# Patient Record
Sex: FEMALE | Race: WHITE
[De-identification: names, ages, dates, MRNs, and addresses within clinical notes are randomized per-mention and may not be internally consistent; named-entity substitution may affect disease eponyms.]

---

## 2020-07-01 ENCOUNTER — HOSPITAL ENCOUNTER (OUTPATIENT)
Dept: HOSPITAL 53 - M PLALAB | Age: 30
End: 2020-07-01
Attending: ADVANCED PRACTICE MIDWIFE
Payer: COMMERCIAL

## 2020-07-01 DIAGNOSIS — O34.211: Primary | ICD-10-CM

## 2020-07-01 DIAGNOSIS — Z3A.00: ICD-10-CM

## 2020-07-01 LAB
CHLAMYDIA DNA AMPLIFICATION: NEGATIVE
HCT VFR BLD AUTO: 40 % (ref 36–47)
HGB BLD-MCNC: 13.4 G/DL (ref 12–15.5)
MCH RBC QN AUTO: 30.7 PG (ref 27–33)
MCHC RBC AUTO-ENTMCNC: 33.5 G/DL (ref 32–36.5)
MCV RBC AUTO: 91.5 FL (ref 80–96)
N GONORRHOEA RRNA SPEC QL NAA+PROBE: NEGATIVE
PLATELET # BLD AUTO: 227 10^3/UL (ref 150–450)
RBC # BLD AUTO: 4.37 10^6/UL (ref 4–5.4)
WBC # BLD AUTO: 12.2 10^3/UL (ref 4–10)

## 2020-07-02 LAB — HIV 1+2 AB+HIV1 P24 AG SERPL QL IA: NEGATIVE

## 2020-07-03 LAB — HCV AB SER QL: 0.2 INDEX (ref ?–0.8)

## 2020-09-08 ENCOUNTER — HOSPITAL ENCOUNTER (OUTPATIENT)
Dept: HOSPITAL 53 - M WHC | Age: 30
End: 2020-09-08
Attending: ADVANCED PRACTICE MIDWIFE
Payer: COMMERCIAL

## 2020-09-08 DIAGNOSIS — Z34.82: Primary | ICD-10-CM

## 2020-09-29 NOTE — REP
COMPLETE OBSTETRICAL ULTRASOUND: 



CLINICAL: Anatomical assessment. 



TECHNIQUE: Transabdominal obstetrical ultrasound with color Doppler evaluation. 



FINDINGS:

Ultrasound examination demonstrates a single live intrauterine pregnancy in 
variable presentation. The placenta is noted posteriorly, grade 1 and low-lying 
approximately 5 mm from the closed internal os. The cervix measures 3.4 cm in 
length and appears closed. Amniotic fluid volume is normal.  



Fetal heart rate is 150 beats per minute.  



BIOMETRY CHART:                     







BPD 41 mm 18 weeks 3 day 

 

Head circumference 158 mm 18 weeks 5 days

 

Abdominal circumference 143 mm 19 weeks 5 days 

 

Femur length 31 mm 19 weeks 5 days 

 

Humeral length 29 mm 19 weeks 3 days 





Estimated gestational age by current measurements is 19 weeks 1 day with 
estimated date of delivery 2/1/21. Estimated fetal weight is 296 grams (82nd 
percentile). 



Anatomical assessment demonstrates normal cisterna magna, cavum, thalamus, 
spine, stomach, kidneys/bladder, heart/ventricular outflow tracts, three vessel 
cord/cord insertion and extremities.  Limited evaluation of the facial features 
due to positioning. 



IMPRESSION: 

1.   Posterior grade 1 low-lying placenta 5 mm from the closed internal os. 

2.   Single live intrauterine pregnancy in variable presentation demonstrating 
appropriate estimated fetal weight and growth.

3.   Limited evaluation of the facial features may warrant follow up. The 
remainder of the anatomical assessment is complete and normal.  

[facility]

## 2020-10-06 ENCOUNTER — HOSPITAL ENCOUNTER (OUTPATIENT)
Dept: HOSPITAL 53 - M WHC | Age: 30
End: 2020-10-06
Attending: ADVANCED PRACTICE MIDWIFE
Payer: COMMERCIAL

## 2020-10-06 DIAGNOSIS — Z3A.22: ICD-10-CM

## 2020-10-12 NOTE — REP
OB ULTRASOUND 



HISTORY: Follow-up anatomy. 



TECHNIQUE: Real-time sonographic evaluation of the gravid uterus is performed 
utilizing transabdominal and endovaginal technique. 



FINDINGS: 

There is a single living intrauterine gestation with estimated gestational age 
of 22 weeks 6 days. Estimated date of confinement (EDC) 02/03/2021. Todays 
measurements indicate appropriate growth. 



BIOMETRY AND GROWTH:







BPD  53 mm  22 weeks 1 day 29th percentile 

 

HC  203 mm  22 weeks 3 days 37th percentile 

 

AC  178 mm  22 weeks 5 days 45th percentile 

 

Femur length  40 mm  23 weeks 0 days 54th percentile 

 

AC/HC ratio  1.14 Normal  1.04 to 1.23

 

Estimated fetal weight  532 g    36th percentile 





Fetal position is breech. Placenta is posterior grade 1 with no previa or 
abruption. Tip of the placenta is approximately 3 cm from the internal cervical 
os. Umbilical cord inserts on the mid aspect of the placenta. Three-vessel cord 
is noted. Fetal heart rate is 143 beats per minute. Amniotic fluid appears 
within normal limits for gestational age. The visualized fetal anatomy today 
includes facial structures, stomach, kidneys, and bladder. These are all grossly
unremarkable. Cervix is closed and measures 3.5 cm in length.

MTDD

## 2020-10-28 ENCOUNTER — HOSPITAL ENCOUNTER (OUTPATIENT)
Dept: HOSPITAL 53 - M PLALAB | Age: 30
End: 2020-10-28
Attending: ADVANCED PRACTICE MIDWIFE
Payer: COMMERCIAL

## 2020-10-28 DIAGNOSIS — Z34.82: Primary | ICD-10-CM

## 2020-10-28 LAB
HCT VFR BLD AUTO: 30 % (ref 36–47)
HGB BLD-MCNC: 9.8 G/DL (ref 12–15.5)
MCH RBC QN AUTO: 31.3 PG (ref 27–33)
MCHC RBC AUTO-ENTMCNC: 32.7 G/DL (ref 32–36.5)
MCV RBC AUTO: 95.8 FL (ref 80–96)
PLATELET # BLD AUTO: 240 10^3/UL (ref 150–450)
RBC # BLD AUTO: 3.13 10^6/UL (ref 4–5.4)
WBC # BLD AUTO: 11.8 10^3/UL (ref 4–10)

## 2021-01-06 ENCOUNTER — HOSPITAL ENCOUNTER (OUTPATIENT)
Dept: HOSPITAL 53 - M PLALAB | Age: 31
End: 2021-01-06
Attending: OBSTETRICS & GYNECOLOGY
Payer: COMMERCIAL

## 2021-01-06 DIAGNOSIS — Z3A.36: ICD-10-CM

## 2021-01-06 DIAGNOSIS — Z34.83: Primary | ICD-10-CM

## 2021-01-06 LAB
HCT VFR BLD AUTO: 28.5 % (ref 36–47)
HGB BLD-MCNC: 9.1 G/DL (ref 12–15.5)
MCH RBC QN AUTO: 31 PG (ref 27–33)
MCHC RBC AUTO-ENTMCNC: 31.9 G/DL (ref 32–36.5)
MCV RBC AUTO: 96.9 FL (ref 80–96)
PLATELET # BLD AUTO: 211 10^3/UL (ref 150–450)
RBC # BLD AUTO: 2.94 10^6/UL (ref 4–5.4)
WBC # BLD AUTO: 12.4 10^3/UL (ref 4–10)

## 2021-01-22 ENCOUNTER — HOSPITAL ENCOUNTER (OUTPATIENT)
Dept: HOSPITAL 53 - M LABSMTC | Age: 31
End: 2021-01-22
Attending: ANESTHESIOLOGY
Payer: COMMERCIAL

## 2021-01-22 DIAGNOSIS — Z01.812: Primary | ICD-10-CM

## 2021-01-22 DIAGNOSIS — Z20.822: ICD-10-CM

## 2021-01-27 ENCOUNTER — HOSPITAL ENCOUNTER (INPATIENT)
Dept: HOSPITAL 53 - M LDI | Age: 31
LOS: 2 days | Discharge: HOME | DRG: 540 | End: 2021-01-29
Attending: OBSTETRICS & GYNECOLOGY | Admitting: OBSTETRICS & GYNECOLOGY
Payer: COMMERCIAL

## 2021-01-27 VITALS — SYSTOLIC BLOOD PRESSURE: 91 MMHG | DIASTOLIC BLOOD PRESSURE: 50 MMHG

## 2021-01-27 VITALS — DIASTOLIC BLOOD PRESSURE: 50 MMHG | SYSTOLIC BLOOD PRESSURE: 102 MMHG

## 2021-01-27 VITALS — WEIGHT: 143.3 LBS | BODY MASS INDEX: 28.13 KG/M2 | HEIGHT: 60 IN

## 2021-01-27 VITALS — SYSTOLIC BLOOD PRESSURE: 107 MMHG | DIASTOLIC BLOOD PRESSURE: 57 MMHG

## 2021-01-27 VITALS — SYSTOLIC BLOOD PRESSURE: 99 MMHG | DIASTOLIC BLOOD PRESSURE: 53 MMHG

## 2021-01-27 VITALS — DIASTOLIC BLOOD PRESSURE: 52 MMHG | SYSTOLIC BLOOD PRESSURE: 102 MMHG

## 2021-01-27 VITALS — DIASTOLIC BLOOD PRESSURE: 56 MMHG | SYSTOLIC BLOOD PRESSURE: 99 MMHG

## 2021-01-27 VITALS — DIASTOLIC BLOOD PRESSURE: 67 MMHG | SYSTOLIC BLOOD PRESSURE: 107 MMHG

## 2021-01-27 DIAGNOSIS — F17.210: ICD-10-CM

## 2021-01-27 DIAGNOSIS — Z3A.39: ICD-10-CM

## 2021-01-27 DIAGNOSIS — O34.211: Primary | ICD-10-CM

## 2021-01-27 LAB
HCT VFR BLD AUTO: 30.9 % (ref 36–47)
HGB BLD-MCNC: 10.3 G/DL (ref 12–15.5)
MCH RBC QN AUTO: 31 PG (ref 27–33)
MCHC RBC AUTO-ENTMCNC: 33.3 G/DL (ref 32–36.5)
MCV RBC AUTO: 93.1 FL (ref 80–96)
PLATELET # BLD AUTO: 225 10^3/UL (ref 150–450)
RBC # BLD AUTO: 3.32 10^6/UL (ref 4–5.4)
WBC # BLD AUTO: 11.1 10^3/UL (ref 4–10)

## 2021-01-27 RX ADMIN — KETOROLAC TROMETHAMINE SCH MG: 30 INJECTION, SOLUTION INTRAMUSCULAR at 15:42

## 2021-01-27 RX ADMIN — DIPHENHYDRAMINE HYDROCHLORIDE PRN MG: 50 INJECTION, SOLUTION INTRAMUSCULAR; INTRAVENOUS at 15:17

## 2021-01-27 RX ADMIN — DOCUSATE SODIUM PRN MG: 100 CAPSULE, LIQUID FILLED ORAL at 21:55

## 2021-01-27 RX ADMIN — DIPHENHYDRAMINE HYDROCHLORIDE PRN MG: 50 INJECTION, SOLUTION INTRAMUSCULAR; INTRAVENOUS at 19:36

## 2021-01-27 RX ADMIN — KETOROLAC TROMETHAMINE SCH MG: 30 INJECTION, SOLUTION INTRAMUSCULAR at 21:55

## 2021-01-27 NOTE — ROOPDOC
[facility] Report Of Operation


Report of Operation


DATE OF PROCEDURE: 21





Report of operation





Preoperative diagnosis: 39 0/7 weeks, prior 





Postoperative diagnosis: same





Procedure: Repeat low transverse  section.





Surgeon: Shahla Rice M.D.





Asst.: Betty Osullivan CNM





EBL: 500 ml.





Urine output: 100 mL's.





Findings: 6 lbs. 13 oz. male infant, Apgar Score 9 and 9 g, normal uterus, 

fallopian tubes, ovaries.





Operative summary: Patient taken to  the operating room where spinal anesthesia 

was induced. She was prepped draped sterile fashion in the supine position. A 

Justin catheter was placed. A Pfannenstiel skin incision was made with scalpel. 

Fascia was incised and extended bilaterally. The peritoneal cavity was entered. 

A Mobius retractor was placed. A bladder flap was created. A curvilinear 

incision was made in lower uterine segment until Clear fluid was noted. The 

incision was extended manually. The infant was delivered from the vertex 

position without difficulty. Cord was double clamped and cut. The infant was 

handed waiting nurses.  The placenta was expressed. Uterus was closed with O-

Vicryl in a running locked fashion. A second imbricating layer of Vicryl was 

placed.








Peritoneum was closed with 2-0 Vicryl a running fashion. Fascia was closed with 

0 Vicryl in running fashion. Skin was closed 4-0 Monocryl subcuticular sutures. 

Sponge, instrument and needle counts were correct.





Betty Osullivan CNM, assisted with all aspects of the procedure. She helped create 

each layer of the incision and deliver the fetus.











SHAHLA RICE MD             2021 10:41

## 2021-01-27 NOTE — CCD
Summarization of Episode Note

                             Created on: 10/30/2020



MARY LOU BRUMFIELD

External Reference #: [id number]

: 1990

Sex: Female



Demographics





                          Address                   [address]  [address] [place] [address]  [address]

 

                          Home Phone                [phone]

 

                          Preferred Language        Unknown

 

                          Marital Status            Unknown

 

                          Moravian Affiliation     Unknown

 

                          Race                      White

 

                          Ethnic Group              Not  or 





Author





                          Author                    [facility]

ems

 

                          Organization              [facility]

ems

 

                          Address                   Unknown

 

                          Phone                     Unavailable







Support





                Name            Relationship    Address         Phone

 

                    MARY LOU BRUMFIELD     GUAR                [address]

[place]  [address]                    [phone]

 

                    AUDREYSHAMA AGAPITO     ECON                [address]  [address] [place] [address]  [address]                    [phone]







Care Team Providers





                    Care Team Member Name Role                Phone

 

                    AbranBetty        Unavailable         [phone]







PROBLEMS





          Type      Condition ICD9-CM Code LBW99-FO Code Onset Dates Condition S

tatus SNOMED 

Code                                    Notes

 

        Problem Supervision of other normal pregnancy         Z34.80          Ac

tive  [id number]  

 

           Problem    Smoking (tobacco) complicating pregnancy, second trimester

            O99.332               

Active                    [id number]                  







ALLERGIES

No Known Allergies



ENCOUNTERS from 1990 to 2020-10-29





             Encounter    Location     Date         Provider     Diagnosis

 

                          [facility] [address] [place] [address] 

[address]          28 Oct, 2020        Betty Osullivan           







IMMUNIZATIONS

No Information



SOCIAL HISTORY

Tobacco Use:



                    Social History Observation Description         Date

 

                    Details (start date - stop date) Current Smoker       



Sex Assigned At Birth:



                          Social History Observation Description

 

                          Sex Assigned At Birth     Unknown



Domestic Violence:



                    Question            Answer              Notes

 

                    Status:                                 No history of abuse



Sexual Hx:



                    Question            Answer              Notes

 

                    Had sex in the last 12 months (vaginal, oral, or anal)? Yes 

                 

 

                    with                Men only             

 

                    Use protection?     No                   



Tobacco Use:



                    Question            Answer              Notes

 

                    Are you a:          current smoker       

 

                    How many cigarettes a day do you smoke? 5 or less           

 

 

                    Are you interested in quitting? Ready to quit        

 

                    Counseled the patient on tobacco use, cessation provided 2020           







REASON FOR REFERRAL

No Information



VITAL SIGNS

No information



MEDICATIONS





             Medication   SIG (Take, Route, Frequency, Duration) Start Date   En

d Date     Status

 

                    Ferrous Sulfate 325 (65 Fe) MG 1 tablet Orally Once a day fo

r 30 day(s) 28 Oct, 

2020                                                Active

 

             Prenatal Vitamins 28-0.8 MG 1 tablet Orally Once a day             

              Active







PROCEDURES

No Information



RESULTS

No Results



REASON FOR VISIT

No Information



MEDICAL (GENERAL) HISTORY





                    Type                Description         Date

 

                    Surgical History    C section           2015

 

                    Hospitalization History childbirth           







Goals Section

No Information



Health Concerns

No Information



MEDICAL EQUIPMENT

No Information



MENTAL STATUS

No Information



FUNCTIONAL STATUS

No Information



ASSESSMENTS

No Information



PLAN OF TREATMENT

Medication



                Medication Name Sig             Start Date      Stop Date

 

                    Ferrous Sulfate 325 (65 Fe) MG 1 tablet Orally Once a day fo

r 30 day(s) 28 Oct, 

2020                                     



Next Appt



                                        Details

 

                                        Provider Name:Reza Montgomery, 2021

 09:30:00 AM, [address] [place] [address], [address], [phone]

 

                                        Provider Name:Betty Osullivan, 2021 0

9:30:00 AM, [address] [place] [address], [address], [phone]







Insurance Providers





             Payer Name   Payer Address Payer Phone  Insured Name Patient Relati

onship to 

Insured                   Coverage Start Date       Coverage End Date

 

                [facility] [employer] [facility] [address]  [facility] [id number]

[phone]    

MARY LOU BRUMFIELD    self

## 2021-01-27 NOTE — CCD
Summarization of Episode Note

                             Created on: 2020



MARY LOU BRUMFIELD

External Reference #: [id number]

: 1990

Sex: Female



Demographics





                          Address                   [address]

[place]  [address]

 

                          Home Phone                [phone]

 

                          Preferred Language        Unknown

 

                          Marital Status            Unknown

 

                          Yarsanism Affiliation     Unknown

 

                          Race                      White

 

                          Ethnic Group              Not  or 





Author





                          Author                    [facility]

ems

 

                          Organization              [facility]

ems

 

                          Address                   Unknown

 

                          Phone                     Unavailable







Support





                Name            Relationship    Address         Phone

 

                    MARY LOU BRUMFIELD     GUAR                [address]

[place]  [address]                    [phone]

 

                    ANA AGAPITO     ECON                [address]

[place]  [address]                    [phone]







Care Team Providers





                    Care Team Member Name Role                Phone

 

                    DorothymaryTania Unavailable         [phone]







PROBLEMS





          Type      Condition ICD9-CM Code LIH75-VW Code Onset Dates Condition S

tatus SNOMED 

Code                                    Notes

 

           Problem    Smoking (tobacco) complicating pregnancy, second trimester

            O99.332               

Active                    [id number]                  

 

          Problem   Anemia affecting pregnancy in third trimester           O99.

013             Active    

[id number]                                 

 

        Problem Supervision of other normal pregnancy         Z34.80          Ac

tive  [id number]  







ALLERGIES

No Known Allergies



ENCOUNTERS from 1990 to 2020





             Encounter    Location     Date         Provider     Diagnosis

 

                          [facility] [address] [place] 

[address]                  Tania Arguello Maternal care for

 anti-D [Rh] 

antibodies, third trimester, not applicable or unspecified O36.0130 ; Maternal 
care due to low transverse uterine scar from previous  delivery O34.211 
; 29 weeks gestation of pregnancy Z3A.29 and Encounter for immunization Z23







IMMUNIZATIONS





                Vaccine         Route           Administration Date Status

 

                RHo (D) Immune Globulin 300mcg/1.5mL (RhoGAM) IM Intramuscular N

2020    

Administered

 

                TDAP 0.5mL (Boostrix) IM Intramuscular 2020    Administe

red







SOCIAL HISTORY

Tobacco Use:



                    Social History Observation Description         Date

 

                    Details (start date - stop date) Current Smoker       



Sex Assigned At Birth:



                          Social History Observation Description

 

                          Sex Assigned At Birth     Unknown



Domestic Violence:



                    Question            Answer              Notes

 

                    Status:                                 No history of abuse



Sexual Hx:



                    Question            Answer              Notes

 

                    Had sex in the last 12 months (vaginal, oral, or anal)? Yes 

                 

 

                    with                Men only             

 

                    Use protection?     No                   



Tobacco Use:



                    Question            Answer              Notes

 

                    Are you a:          current smoker       

 

                    How many cigarettes a day do you smoke? 5 or less           

 

 

                    Are you interested in quitting? Ready to quit        

 

                    Counseled the patient on tobacco use, cessation provided 2020           







REASON FOR REFERRAL

No Information



VITAL SIGNS





                    Weight              134.6 lbs           

 

                    Height              60 in               

 

                    BMI                 26.287 kg/m2        

 

                    Blood pressure systolic 98 mm Hg            

 

                    Blood pressure diastolic 58 mm Hg            







MEDICATIONS





           Medication SIG (Take, Route, Frequency, Duration) Notes      Start Da

te End Date   

Status

 

           Prenatal Vitamins 28-0.8 MG 1 tablet Orally Once a day               

                   Active

 

                Ferrous Sulfate 325 (65 Fe) MG 1 tablet Orally Once a day for 30

 day(s)                 28 

Oct, 2020                                           Active







PROCEDURES





                Procedure       Date Ordered    Result          Body Site

 

                Immunization: Boostrix 0.5mL IM (TDAP) 2020      N/A      

        

 

                    Injection: RhoGAM 300mcg/1.5mL IM (Rho [D] Immune Globulin H

uman) 2020          N/A

                                         







RESULTS

No Results



REASON FOR VISIT

3-4 WK PN



MEDICAL (GENERAL) HISTORY





                    Type                Description         Date

 

                    Surgical History    C section           2015

 

                    Hospitalization History childbirth           







Goals Section

No Information



Health Concerns

No Information



MEDICAL EQUIPMENT

No Information



MENTAL STATUS

No Information



FUNCTIONAL STATUS

No Information



ASSESSMENTS





             Encounter Date Diagnosis    Assessment Notes Treatment Notes Treatm

ent Clinical 

Notes

 

                                        Maternal care for anti-D [Rh

] antibodies, third trimester, not 

applicable or unspecified (ICD-10 - O36.0130)                           



                                        





 

                                        Maternal care due to low tra

nsverse uterine scar from previous 

 delivery (ICD-10 - O34.211)                           



                                        





 

                    29 weeks gestation of pregnancy (ICD-10 - Z3A.29

)                 



                                        





 

                    Encounter for immunization (ICD-10 - Z23)       

          



                                        











PLAN OF TREATMENT

Next Appt



                                        Details

 

                                        2 Weeks Reason:Prenatal

 

                                        Provider Name:Kami Schumacher, 2020 01:00:00 PM, [address] [place], [address], [phone]

 

                                        Provider Name:Reza Montgomery, 2021

 01:00:00 PM, [address] [place], [address], [phone]

 

                                        Provider Name:Reza Montgomery 2021

 09:30:00 AM, [address] [place] [address], [address], [phone]

 

                                        Provider Name:Betty Osullivan, 2021 0

9:30:00 AM, [address] [place] [address], [address], [phone]

 

                                        Provider Name:Reza Montgomery, 2021-02-10

 09:40:00 AM, [address] [place] [address], [address], [phone]

 

                                        Provider Name:Reza Montgomery, 2021

 11:40:00 AM, [address] [place] [address], [address], [phone]



Follow Up:2 WeeksPrenatal



Insurance Providers





             Payer Name   Payer Address Payer Phone  Insured Name Patient Relati

onship to 

Insured                   Coverage Start Date       Coverage End Date

 

                [facility] hospital COMM[employer] [facility] [address]  [facility] [phone]

[phone]    

MARY LOU BRUMFIELD    self

## 2021-01-27 NOTE — CCD
Summarization of Episode Note

                             Created on: 2021



MARY LOU BRUMFIELD

External Reference #: [id number]

: 1990

Sex: Female



Demographics





                          Address                   [address] [place]  [address]

 

                          Home Phone                [phone]

 

                          Preferred Language        Unknown

 

                          Marital Status            Unknown

 

                          Yazidi Affiliation     Unknown

 

                          Race                      White

 

                          Ethnic Group              Not  or 





Author





                          Author                    [facility]

ems

 

                          Organization              [facility]

ems

 

                          Address                   Unknown

 

                          Phone                     Unavailable







Support





                Name            Relationship    Address         Phone

 

                    LARAMARY LOU SIGALA     GUAR                [address] [place]  [address]                    [phone]

 

                    AGAPITO PEREZ     ECON                [address]  [address]                    [phone]







Care Team Providers





                    Care Team Member Name Role                Phone

 

                    Reza Montgomery      Unavailable         [phone]







PROBLEMS





          Type      Condition ICD9-CM Code NDW93-LL Code Onset Dates Condition S

tatus SNOMED 

Code                                    Notes

 

           Problem    Smoking (tobacco) complicating pregnancy, second trimester

            O99.332               

Active                    [id number]                  

 

          Problem   Anemia affecting pregnancy in third trimester           O99.

013             Active    

13007511                                 

 

        Problem Supervision of other normal pregnancy         Z34.80          Ac

tive  [id number]  







ALLERGIES

No Known Allergies



ENCOUNTERS from 1990 to 2021





             Encounter    Location     Date         Provider     Diagnosis

 

                          [facility] [address] [place] 

[address]                  Reza Montgomery        36 weeks gestation o

f pregnancy Z3A.36 and 

Maternal care due to low transverse uterine scar from previous  delivery
O34.211







IMMUNIZATIONS





                Vaccine         Route           Administration Date Status

 

                RHo (D) Immune Globulin 300mcg/1.5mL (RhoGAM) IM Intramuscular N

2020    

Administered

 

                TDAP 0.5mL (Boostrix) IM Intramuscular 2020    Administe

red







SOCIAL HISTORY

Tobacco Use:



                    Social History Observation Description         Date

 

                    Details (start date - stop date) Current Smoker       



Sex Assigned At Birth:



                          Social History Observation Description

 

                          Sex Assigned At Birth     Unknown



Domestic Violence:



                    Question            Answer              Notes

 

                    Status:                                 No history of abuse



Sexual Hx:



                    Question            Answer              Notes

 

                    Had sex in the last 12 months (vaginal, oral, or anal)? Yes 

                 

 

                    with                Men only             

 

                    Use protection?     No                   



Tobacco Use:



                    Question            Answer              Notes

 

                    Are you a:          current smoker       

 

                    How many cigarettes a day do you smoke? 5 or less           

 

 

                    Are you interested in quitting? Ready to quit        

 

                    Counseled the patient on tobacco use, cessation provided 2020           







REASON FOR REFERRAL

No Information



VITAL SIGNS





                    Weight              141.8 lbs           

 

                    Weight-kg           64.32 kg            

 

                    Height              60 in               

 

                    BMI                 27.693 kg/m2        

 

                    Blood pressure systolic 108 mm Hg           

 

                    Blood pressure diastolic 72 mm Hg            







MEDICATIONS





           Medication SIG (Take, Route, Frequency, Duration) Notes      Start Da

te End Date   

Status

 

           Prenatal Vitamins 28-0.8 MG 1 tablet Orally Once a day               

                   Active

 

                Ferrous Sulfate 325 (65 Fe) MG 1 tablet Orally Once a day for 30

 day(s)                 28 

Oct, 2020                                           Active







PROCEDURES

No Information



RESULTS





                    Component           Value               Reference Range

 

                                        GROUP B STREP CULTURE

Reviewed date:2021 15:25:12

Interpretation:

Performing Lab:[facility], [facility] [address] [facility] [address] [phone], Phone:[phone],[place] [address] 







REASON FOR VISIT

1WK PN & PRE OP C/S 21



MEDICAL (GENERAL) HISTORY





                    Type                Description         Date

 

                    Surgical History    C section           2015

 

                    Hospitalization History childbirth           







Goals Section

No Information



Health Concerns

No Information



MEDICAL EQUIPMENT

No Information



MENTAL STATUS

No Information



FUNCTIONAL STATUS

No Information



ASSESSMENTS





             Encounter Date Diagnosis    Assessment Notes Treatment Notes Treatm

ent Clinical 

Notes

 

                    36 weeks gestation of pregnancy (ICD-10 - Z3A.36

)                 



                                        





 

                                        Maternal care due to low tra

nsverse uterine scar from previous 

 delivery (ICD-10 - O34.211)                           



                                        











PLAN OF TREATMENT

Treatment Notes



                          Test Name                 Order Date

 

                          CBC - Complete Blood Count 2021



Next Appt



                                        Details

 

                                        Provider Name:Anitra Roy, 2021

 11:40:00 AM, [address] [place] [address], [address], [phone]

 

                                        Provider Name:Reza Montgomery, 2021

 09:30:00 AM, [address] [place] [address], [address], [phone]

 

                                        Provider Name:Betty Osullivan, 2021 0

9:30:00 AM, [address] [place] [address], [address], [phone]

 

                                        Provider Name:Reza Montgomery, 2021-02-10

 10:00:00 AM, [address] [place], [address], [phone]

 

                                        Provider Name:Reza Montgomery, 2021

 11:40:00 AM, [address] [place], [address], [phone]







Insurance Providers





             Payer Name   Payer Address Payer Phone  Insured Name Patient Relati

onship to 

Insured                   Coverage Start Date       Coverage End Date

 

                [facility] [employer] [facility] [address]  [facility] [address] 8

-[phone]    

MARY LOU BRUMFIELD

## 2021-01-27 NOTE — CCD
Summarization of Episode Note

                             Created on: 2020



MARY LOU BRUMFIELD

External Reference #: [id number]

: 1990

Sex: Female



Demographics





                          Address                   [address]  [address]

 

                          Home Phone                [phone]

 

                          Preferred Language        Unknown

 

                          Marital Status            Unknown

 

                          Confucianism Affiliation     Unknown

 

                          Race                      White

 

                          Ethnic Group              Not  or 





Author





                          Author                    [facility]

ems

 

                          Organization              [facility]

ems

 

                          Address                   Unknown

 

                          Phone                     Unavailable







Support





                Name            Relationship    Address         Phone

 

                    MARY LOU BRUMFIELD     GUAR                [address]

[place]  [address]                    [phone]

 

                    ANA AGAPITO     ECON                [address] [place] [address]  [address]                    [phone]







Care Team Providers





                    Care Team Member Name Role                Phone

 

                    Reza Montgomery      Unavailable         [phone]







PROBLEMS





          Type      Condition ICD9-CM Code MDA01-LK Code Onset Dates Condition S

tatus SNOMED 

Code                                    Notes

 

        Problem Supervision of other normal pregnancy         Z34.80          Ac

tive  [id number]  

 

           Problem    Smoking (tobacco) complicating pregnancy, second trimester

            O99.332               

Active                    202012164                  







ALLERGIES

No Known Allergies



ENCOUNTERS from 1990 to 2020





             Encounter    Location     Date         Provider     Diagnosis

 

                          [facility] [address] [place] [address] 

[address]                  Reza Montgomery         







IMMUNIZATIONS

No Information



SOCIAL HISTORY

Tobacco Use:



                    Social History Observation Description         Date

 

                    Details (start date - stop date) Current Smoker       



Sex Assigned At Birth:



                          Social History Observation Description

 

                          Sex Assigned At Birth     Unknown



Domestic Violence:



                    Question            Answer              Notes

 

                    Status:                                 No history of abuse



Sexual Hx:



                    Question            Answer              Notes

 

                    Had sex in the last 12 months (vaginal, oral, or anal)? Yes 

                 

 

                    with                Men only             

 

                    Use protection?     No                   



Tobacco Use:



                    Question            Answer              Notes

 

                    Are you a:          current smoker       

 

                    How many cigarettes a day do you smoke? 5 or less           

 

 

                    Are you interested in quitting? Ready to quit        

 

                    Counseled the patient on tobacco use, cessation provided 2020           







REASON FOR REFERRAL

No Information



VITAL SIGNS

No information



MEDICATIONS





           Medication SIG (Take, Route, Frequency, Duration) Notes      Start Da

te End Date   

Status

 

                Ferrous Sulfate 325 (65 Fe) MG 1 tablet Orally Once a day for 30

 day(s)                 28 

Oct, 2020                                           Active

 

           Prenatal Vitamins 28-0.8 MG 1 tablet Orally Once a day               

                   Active







PROCEDURES

No Information



RESULTS

No Results



REASON FOR VISIT

AUTHORIZATION



MEDICAL (GENERAL) HISTORY





                    Type                Description         Date

 

                    Surgical History    C section           2015

 

                    Hospitalization History childbirth           







Goals Section

No Information



Health Concerns

No Information



MEDICAL EQUIPMENT

No Information



MENTAL STATUS

No Information



FUNCTIONAL STATUS

No Information



ASSESSMENTS

No Information



PLAN OF TREATMENT

Medication



                Medication Name Sig             Start Date      Stop Date

 

                    Ferrous Sulfate 325 (65 Fe) MG 1 tablet Orally Once a day fo

r 30 day(s) 28 Oct, 

2020                                     



Next Appt



                                        Details

 

                                        Provider Name:Tania PRANAV Saundra, 

2020 11:00:00 AM, [address] [place] [address], [address], [phone]

 

                                        Provider Name:Reza Montgomery, 2021

 01:00:00 PM, [address] [place] [address], [address], [phone]

 

                                        Provider Name:Reza Montgomery, 2021

 09:30:00 AM, [address] [place] [address], [address], [phone]

 

                                        Provider Name:Betty Osullvian, 2021 0

9:30:00 AM, [address] [place] [address], [address], [phone]

 

                                        Provider Name:Reza Montgomery, 2021-02-10

 09:40:00 AM, [address] [place] [address], [address], [phone]

 

                                        Provider Name:Reza Montgomery, 2021

 11:40:00 AM, [address] [place] [address], [address], [phone]







Insurance Providers





             Payer Name   Payer Address Payer Phone  Insured Name Patient Relati

onship to 

Insured                   Coverage Start Date       Coverage End Date

 

                [facility] [employer] [facility] [address]  [facility] [address] 8

[phone]    

MARY LOU BRUMFIELD    self

## 2021-01-27 NOTE — CCD
Summarization of Episode Note

                             Created on: 2020



MARY LOU BRUMFIELD

External Reference #: [id number]

: 1990

Sex: Female



Demographics





                          Address                   [address]  [address] [place] [address]  [address]

 

                          Home Phone                [phone]

 

                          Preferred Language        Unknown

 

                          Marital Status            Unknown

 

                          Advent Affiliation     Unknown

 

                          Race                      White

 

                          Ethnic Group              Not  or 





Author





                          Author                    [facility]

ems

 

                          Organization              [facility]

ems

 

                          Address                   Unknown

 

                          Phone                     Unavailable







Support





                Name            Relationship    Address         Phone

 

                    MARY LOU BRUMFIELD     GUAR                [address]

[place]  [address]                    [phone]

 

                    AGAPITO PEREZ     ECON                [address] [place] [address]  [address]                    [phone]







Care Team Providers





                    Care Team Member Name Role                Phone

 

                    Anitra Roy      Unavailable         [phone]







PROBLEMS





          Type      Condition ICD9-CM Code NER35-OM Code Onset Dates Condition S

tatus SNOMED 

Code                                    Notes

 

        Problem Supervision of other normal pregnancy         Z34.80          Ac

tive  [id number]  

 

           Problem    Smoking (tobacco) complicating pregnancy, second trimester

            O99.332               

Active                    [id number]                  







ALLERGIES

No Known Allergies



ENCOUNTERS from 1990 to 2020





             Encounter    Location     Date         Provider     Diagnosis

 

                          [facility] [address] [place] [address] 

[address]          28 Oct, 2020        Anitra Roy        Smoking (tobacco) co

mplicating pregnancy, 

second trimester O99.332 ; Nicotine dependence, cigarettes, uncomplicated 
F17.210 ; Maternal care due to low transverse uterine scar from previous 
 delivery O34.211 and 26 weeks gestation of pregnancy Z3A.26







IMMUNIZATIONS

No Information



SOCIAL HISTORY

Tobacco Use:



                    Social History Observation Description         Date

 

                    Details (start date - stop date) Current Smoker       



Sex Assigned At Birth:



                          Social History Observation Description

 

                          Sex Assigned At Birth     Unknown



Domestic Violence:



                    Question            Answer              Notes

 

                    Status:                                 No history of abuse



Sexual Hx:



                    Question            Answer              Notes

 

                    Had sex in the last 12 months (vaginal, oral, or anal)? Yes 

                 

 

                    with                Men only             

 

                    Use protection?     No                   



Tobacco Use:



                    Question            Answer              Notes

 

                    Are you a:          current smoker       

 

                    How many cigarettes a day do you smoke? 5 or less           

 

 

                    Are you interested in quitting? Ready to quit        

 

                    Counseled the patient on tobacco use, cessation provided 2020           







REASON FOR REFERRAL

No Information



VITAL SIGNS





                    Weight              132.8 lbs           28 Oct, 2020

 

                    Height              60 in               28 Oct, 2020

 

                    BMI                 25.936 kg/m2        28 Oct, 2020

 

                    Blood pressure systolic 102 mm Hg           28 Oct, 2020

 

                    Blood pressure diastolic 58 mm Hg            28 Oct, 2020







MEDICATIONS





           Medication SIG (Take, Route, Frequency, Duration) Notes      Start Da

te End Date   

Status

 

                Ferrous Sulfate 325 (65 Fe) MG 1 tablet Orally Once a day for 30

 day(s)                 28 

Oct, 2020                                           Active

 

           Prenatal Vitamins 28-0.8 MG 1 tablet Orally Once a day               

                   Active







PROCEDURES

No Information



RESULTS

No Results



REASON FOR VISIT

4WK PN



MEDICAL (GENERAL) HISTORY





                    Type                Description         Date

 

                    Surgical History    C section           2015

 

                    Hospitalization History childbirth           







Goals Section

No Information



Health Concerns

No Information



MEDICAL EQUIPMENT

No Information



MENTAL STATUS

No Information



FUNCTIONAL STATUS

No Information



ASSESSMENTS





             Encounter Date Diagnosis    Assessment Notes Treatment Notes Treatm

ent Clinical 

Notes

 

                          28 Oct, 2020              Smoking (tobacco) complicati

ng pregnancy, second trimester (ICD-10 

- O99.332)                                          



                                        





 

                    28 Oct, 2020        Nicotine dependence, cigarettes, uncompl

icated (ICD-10 - F17.210)  

                                        



                                        





 

                          28 Oct, 2020              Maternal care due to low tra

nsverse uterine scar from previous 

 delivery (ICD-10 - O34.211)                           



                                        





 

                28 Oct, 2020    26 weeks gestation of pregnancy (ICD-10 - Z3A.26

)                 



                                        











PLAN OF TREATMENT

Medication



                Medication Name Sig             Start Date      Stop Date

 

                    Ferrous Sulfate 325 (65 Fe) MG 1 tablet Orally Once a day fo

r 30 day(s) 28 Oct, 

2020                                     



Next Appt



                                        Details

 

                                        3-4 Weeks Reason:PAP/Prenatal

 

                                        Provider Name:Tania Arguello, 

2020 11:00:00 AM, [address] [place] [address], [address], [phone]

 

                                        Provider Name:Reza Montgomery, 2021

 01:00:00 PM, [address] [place] [address], [address], [phone]

 

                                        Provider Name:Reza Montgomery, 2021

 09:30:00 AM, [address] [place] [address], [address], [phone]

 

                                        Provider Name:Betty Osullivan, 2021 0

9:30:00 AM, [address] [place] [address], [address], [phone]

 

                                        Provider Name:Reza Montgomery, 2021-02-10

 09:40:00 AM, [address] [place], [address], [phone]

 

                                        Provider Name:Reza Montgomery, 2021

 11:40:00 AM, [address] [place], [address], [phone]



Follow Up:3-4 WeeksPAP/Prenatal



Insurance Providers





             Payer Name   Payer Address Payer Phone  Insured Name Patient Relati

onship to 

Insured                   Coverage Start Date       Coverage End Date

 

                [facility] [employer] [facility] [address]  [facility] [phone] 8

[phone]    

MARY LOU BRUMFIELD

## 2021-01-27 NOTE — CCD
Summarization of Episode Note

                             Created on: 12/15/2020



MARY LOU BRUMFIELD

External Reference #: [id number]

: 1990

Sex: Female



Demographics





                          Address                   [address] [place]  [address]

 

                          Home Phone                [phone]

 

                          Preferred Language        Unknown

 

                          Marital Status            Unknown

 

                          Spiritism Affiliation     Unknown

 

                          Race                      White

 

                          Ethnic Group              Not  or 





Author





                          Author                    [facility]

ems

 

                          Organization              [facility]

ems

 

                          Address                   Unknown

 

                          Phone                     Unavailable







Support





                Name            Relationship    Address         Phone

 

                    OCTAVIANOCOLLETTEMARY LOU     GUAR                [address] [place]  [address]                    [phone]

 

                    AGAPITO PEREZ     ECON                [address]  [id number] [phone]







Care Team Providers





                    Care Team Member Name Role                Phone

 

                    Kami Schumacher   Unavailable         [phone]







PROBLEMS





          Type      Condition ICD9-CM Code BGH41-WB Code Onset Dates Condition S

tatus SNOMED 

Code                                    Notes

 

           Problem    Smoking (tobacco) complicating pregnancy, second trimester

            O99.332               

Active                    [id number]                  

 

          Problem   Anemia affecting pregnancy in third trimester           O99.

013             Active    

[id number]                                 

 

        Problem Supervision of other normal pregnancy         Z34.80          Ac

tive  [id number]  







ALLERGIES

No Known Allergies



ENCOUNTERS from 1990 to 2020





             Encounter    Location     Date         Provider     Diagnosis

 

                          [facility] [address] [place], [place] 

[address]          08 Dec, 2020        Kami Schumacher     31 weeks gestation o

f pregnancy Z3A.31 

and Maternal care due to low transverse uterine scar from previous  
delivery O34.211







IMMUNIZATIONS





                Vaccine         Route           Administration Date Status

 

                RHo (D) Immune Globulin 300mcg/1.5mL (RhoGAM) IM Intramuscular N

2020    

Administered

 

                TDAP 0.5mL (Boostrix) IM Intramuscular 2020    Administe

red







SOCIAL HISTORY

Tobacco Use:



                    Social History Observation Description         Date

 

                    Details (start date - stop date) Current Smoker       



Sex Assigned At Birth:



                          Social History Observation Description

 

                          Sex Assigned At Birth     Unknown



Domestic Violence:



                    Question            Answer              Notes

 

                    Status:                                 No history of abuse



Sexual Hx:



                    Question            Answer              Notes

 

                    Had sex in the last 12 months (vaginal, oral, or anal)? Yes 

                 

 

                    with                Men only             

 

                    Use protection?     No                   



Tobacco Use:



                    Question            Answer              Notes

 

                    Are you a:          current smoker       

 

                    How many cigarettes a day do you smoke? 5 or less           

 

 

                    Are you interested in quitting? Ready to quit        

 

                    Counseled the patient on tobacco use, cessation provided 2020           







REASON FOR REFERRAL

No Information



VITAL SIGNS





                    Weight              138.0 lbs           08 Dec, 2020

 

                    Weight-kg           62.6 kg             08 Dec, 2020

 

                    Height              60 in               08 Dec, 2020

 

                    BMI                 26.951 kg/m2        08 Dec, 2020

 

                    Blood pressure systolic 106 mm Hg           08 Dec, 2020

 

                    Blood pressure diastolic 56 mm Hg            08 Dec, 2020







MEDICATIONS





           Medication SIG (Take, Route, Frequency, Duration) Notes      Start Da

te End Date   

Status

 

           Prenatal Vitamins 28-0.8 MG 1 tablet Orally Once a day               

                   Active

 

                Ferrous Sulfate 325 (65 Fe) MG 1 tablet Orally Once a day for 30

 day(s)                 28 

Oct, 2020                                           Active







PROCEDURES

No Information



RESULTS

No Results



REASON FOR VISIT

2WK PN



MEDICAL (GENERAL) HISTORY





                    Type                Description         Date

 

                    Surgical History    C section           2015

 

                    Hospitalization History childbirth           







Goals Section

No Information



Health Concerns

No Information



MEDICAL EQUIPMENT

No Information



MENTAL STATUS

No Information



FUNCTIONAL STATUS

No Information



ASSESSMENTS





             Encounter Date Diagnosis    Assessment Notes Treatment Notes Treatm

ent Clinical 

Notes

 

                08 Dec, 2020    31 weeks gestation of pregnancy (ICD-10 - Z3A.31

)                 



                                        





 

                          08 Dec, 2020              Maternal care due to low tra

nsverse uterine scar from previous 

 delivery (ICD-10 - O34.211)                           



                                        











PLAN OF TREATMENT

Next Appt



                                        Details

 

                                        4 Weeks Reason:PN

 

                                        Provider Name:Reza Montgomery, 2021

 01:00:00 PM, [address] [place] [address], [address], [phone]

 

                                        Provider Name:Reza Montgomery 2021

 09:30:00 AM, [address] [place] [address], [address], [phone]

 

                                        Provider Name:Betty Osullivan, 2021 0

9:30:00 AM, [address] [place] [address], [address], [phone]

 

                                        Provider Name:Reza Montgomery 2021-02-10

 09:40:00 AM, [address] [place] [address], [address], [phone]

 

                                        Provider Name:Reza Montgomery 2021

 11:40:00 AM, [address] [place] [address], [address], [phone]



Follow Up:4 WeeksPN



Insurance Providers





             Payer Name   Payer Address Payer Phone  Insured Name Patient Relati

onship to 

Insured                   Coverage Start Date       Coverage End Date

 

                [facility] [employer] [facility] [address]  [facility] [phone]

[phone]    

MARY LOU BRUMFIELD    self

## 2021-01-28 VITALS — DIASTOLIC BLOOD PRESSURE: 51 MMHG | SYSTOLIC BLOOD PRESSURE: 94 MMHG

## 2021-01-28 VITALS — DIASTOLIC BLOOD PRESSURE: 43 MMHG | SYSTOLIC BLOOD PRESSURE: 86 MMHG

## 2021-01-28 VITALS — DIASTOLIC BLOOD PRESSURE: 55 MMHG | SYSTOLIC BLOOD PRESSURE: 101 MMHG

## 2021-01-28 VITALS — DIASTOLIC BLOOD PRESSURE: 56 MMHG | SYSTOLIC BLOOD PRESSURE: 100 MMHG

## 2021-01-28 VITALS — DIASTOLIC BLOOD PRESSURE: 57 MMHG | SYSTOLIC BLOOD PRESSURE: 104 MMHG

## 2021-01-28 VITALS — SYSTOLIC BLOOD PRESSURE: 87 MMHG | DIASTOLIC BLOOD PRESSURE: 45 MMHG

## 2021-01-28 VITALS — DIASTOLIC BLOOD PRESSURE: 59 MMHG | SYSTOLIC BLOOD PRESSURE: 101 MMHG

## 2021-01-28 VITALS — SYSTOLIC BLOOD PRESSURE: 104 MMHG | DIASTOLIC BLOOD PRESSURE: 54 MMHG

## 2021-01-28 LAB
HCT VFR BLD AUTO: 27.8 % (ref 36–47)
HGB BLD-MCNC: 9 G/DL (ref 12–15.5)
MCH RBC QN AUTO: 31.3 PG (ref 27–33)
MCHC RBC AUTO-ENTMCNC: 32.4 G/DL (ref 32–36.5)
MCV RBC AUTO: 96.5 FL (ref 80–96)
PLATELET # BLD AUTO: 187 10^3/UL (ref 150–450)
RBC # BLD AUTO: 2.88 10^6/UL (ref 4–5.4)
WBC # BLD AUTO: 10.5 10^3/UL (ref 4–10)

## 2021-01-28 RX ADMIN — IBUPROFEN SCH MG: 800 TABLET, FILM COATED ORAL at 19:50

## 2021-01-28 RX ADMIN — Medication SCH TAB: at 08:06

## 2021-01-28 RX ADMIN — DOCUSATE SODIUM PRN MG: 100 CAPSULE, LIQUID FILLED ORAL at 19:50

## 2021-01-28 RX ADMIN — KETOROLAC TROMETHAMINE SCH MG: 30 INJECTION, SOLUTION INTRAMUSCULAR at 04:03

## 2021-01-28 RX ADMIN — DIPHENHYDRAMINE HYDROCHLORIDE PRN MG: 50 INJECTION, SOLUTION INTRAMUSCULAR; INTRAVENOUS at 04:03

## 2021-01-28 RX ADMIN — IBUPROFEN SCH MG: 800 TABLET, FILM COATED ORAL at 11:50

## 2021-01-29 VITALS — DIASTOLIC BLOOD PRESSURE: 61 MMHG | SYSTOLIC BLOOD PRESSURE: 110 MMHG

## 2021-01-29 VITALS — SYSTOLIC BLOOD PRESSURE: 105 MMHG | DIASTOLIC BLOOD PRESSURE: 56 MMHG

## 2021-01-29 RX ADMIN — Medication SCH TAB: at 08:55

## 2021-01-29 RX ADMIN — IBUPROFEN SCH MG: 800 TABLET, FILM COATED ORAL at 04:11

## 2021-01-29 NOTE — DS.PDOC
Discharge Summary


General


Date of Admission


2021 at 07:12


Date of Discharge





Attending Physician:  SHAHLA RICE MD





Discharge Summary


PROCEDURES PERFORMED DURING STAY: [None].





ADMITTING DIAGNOSES: 


1. 39 weeks, prior .





DISCHARGE DIAGNOSES:


1. Same.





COMPLICATIONS/CHIEF COMPLAINT: Previous  Section.





HISTORY OF PRESENT ILLNESS: 30-year-old  female at 39 weeks gestation 

presents for repeat  section. She has had 1 prior  section. 

There were no prenatal complications.





HOSPITAL COURSE: On 2021. The patient underwent repeat low transverse 

 section without complication. Her postoperative course was 

unremarkable. She had adequate return of bladder and bowel function. Her 

postoperative hemoglobin was 9.0 g/dL she was deemed stable for discharge on 

postop day #2. 





DISCHARGE MEDICATIONS: Please see below.


 


ALLERGIES: Please see below.





PHYSICAL EXAMINATION ON DISCHARGE:


VITAL SIGNS: Please see below.


GENERAL: Within normal limits


HEENT: Normocephalic, atraumatic


NECK: Within normal limits


CARDIOVASCULAR EXAMINATION: RRR


RESPIRATORY EXAMINATION: Clear to auscultation


ABDOMINAL EXAMINATION: Nontender, incision clean, dry, intact


EXTREMITIES: Nontender, trace edema





LABORATORY DATA: Please see below.





PROGNOSIS: good





ACTIVITY: As tolerated.





DIET: Regular





DISCHARGE INSTRUCTIONS:


1. Discharge home.


2. Instructions reviewed








DISCHARGE CONDITION: Stable.





TIME SPENT ON DISCHARGE: Greater than 10 minutes.





Vital Signs/I&Os





Vital Signs








  Date Time  Temp Pulse Resp B/P (MAP) Pulse Ox O2 Delivery O2 Flow Rate FiO2


 


21 08:57   18     


 


21 06:14 98.0 77  105/56 (72)    


 


21 02:00     98 Room Air  














I&O- Last 24 Hours up to 6 AM 


 


 21





 06:00


 


Intake Total 200 ml


 


Balance 200 ml











Discharge Medications


Scheduled


Ibuprofen (Ibuprofen) 800 Mg Tablet, 800 MG PO Q8H





Scheduled PRN


Oxycodone HCl/Acetaminophen (Oxycodone-Acetaminophen 5-325) 1 Each Tablet, 1 TAB

 PO TIDP PRN for pain





Allergies


Coded Allergies:  


     No Known Allergies (Unverified , 4/18/15)











SHAHLA RICE MD             2021 09:32

## 2021-07-29 NOTE — CCD
Summarization Of Episode

                             Created on: 2021



OCTAVIANO MARY LOU A

External Reference #: [id number]

: 1990

Sex: Female



Demographics





                          Address                   [address] [place] [address]

 

                          Home Phone                [phone]

 

                          Preferred Language        English

 

                          Marital Status            Single

 

                          Christian Affiliation     NONE

 

                          Race                      White

 

                          Ethnic Group              Not  or 





Author





                          Author                    [facility]

 

                          Organization              [facility]

 

                          Address                   Unknown

 

                          Phone                     Unavailable







Support





                Name            Relationship    Address         Phone

 

                    AGAPITO MONTERROSO    Next Of Kin         [address] [place] [address]                    [phone]

 

                    LARAJOVON  LOURDES    Next Of Kin         [address] [place]

[place] [address]                    [phone]

 

                    AGAPITO PEREZ    Next Of Kin         [address] [place] [address]                    [phone]

 

                    THE LARA INN     Next Of Kin         [address] [facility] [place] 

[place] [address]                    [phone]

 

                UE              Next Of Kin     Unknown         Unavailable

 

                    LARAJOVON, CUSTODY LUISA(LEGAL Next Of Kin         [address]

[place] [address]                    [phone]

 

                    AGAPITO PEREZ    ECON                [address] [place] [address]                    Unavailable







Care Team Providers





                    Care Team Member Name Role                Phone

 

                    HAO GARCIA    Unavailable         Unavailable



                                  



Re-disclosure Warning

          The records that you are about to access may contain information from 
federally-assisted alcohol or drug abuse programs. If such information is 
present, then the following federally mandated warning applies: This information
has been disclosed to you from records protected by federal confidentiality 
rules (42 CFR part 2). The federal rules prohibit you from making any further 
disclosure of this information unless further disclosure is expressly permitted 
by the written consent of the person to whom it pertains or as otherwise 
permitted by 42 CFR part 2. A general authorization for the release of medical 
or other information is NOT sufficient for this purpose. The Federal rules 
restrict any use of the information to criminally investigate or prosecute any 
alcohol or drug abuse patient.The records that you are about to access may 
contain highly sensitive health information, the redisclosure of which is 
protected by Article 27-F of the [place] Public Health law. If you 
continue you may have access to information: Regarding HIV / AIDS; Provided by 
facilities licensed or operated by the [facility] Office of Mental Health; 
or Provided by the [facility] Office for People With Developmental 
Disabilities. If such information is present, then the following [place] 
mandated warning applies: This information has been disclosed to you from 
confidential records which are protected by state law. State law prohibits you 
from making any further disclosure of this information without the specific 
written consent of the person to whom it pertains, or as otherwise permitted by 
law. Any unauthorized further disclosure in violation of state law may result in
a fine or long-term sentence or both. A general authorization for the release of 
medical or other information is NOT sufficient authorization for further disc
losure.                                                                         
    



Family History

          



             Family Member Name Family Member Gender Family Member Status Date o

f Status 

Description                             Data Source(s)

 

           Unknown    Unknown    Problem                          [facility] ([facility]



                                                                                
       



Encounters

          



           Encounter  Providers  Location   Date       Indications Data Source(s

)

 

                ( ESTOB) [facility]                 [address] [place] [address] 

2021 12:00:00 AM EST                           eCW1 ([facility]

 

                ( ESTOB) [facility]                 [address] [place] [address] 

2020 12:00:00 AM EST                           eCW1 ([facility]

 

                ( ESTOB) [facility]                 [address] [place] [address] 

2020 12:00:00 AM EST                           eCW1 ([facility]

 

                Unknown                         [address] [facility] [id number] 2020 12:00:00 AM 

EST                                                 [facility]CW[facility] ([facility]

 

                ( ESTOB) [facility]                 [address] [place] [address] 

10/28/2020 12:00:00 AM EDT                           eCW1 ([facility]

 

                Unknown                         [address] [facility] [id number] 10/28/2020 12:00:00 AM 

EDT                                                 eCW1 ([facility]

 

           Outpatient Attender: HAO [facility]     2020 07:52:01 PM

 EDT            [facility]



                                                                                
                                                                   



Immunizations

          



             Vaccine      Date         Status       Description  Data Source(s)

 

                    RHo (D) Immune Globulin 300mcg/1.5mL (RhoGAM) 2020 11:

36:00 AM EST 

completed                                           eCW1 ([facility]

 

                    RHo (D) Immune Globulin 300mcg/1.5mL (RhoGAM) 2020 11:

36:00 AM EST 

completed                                           eCW1 ([facility]

 

                    RHo (D) Immune Globulin 300mcg/1.5mL (RhoGAM) 2020 11:

36:00 AM EST 

completed                                           eCW1 ([facility] hospital)

 

             Tdap         2020 11:34:00 AM EST completed                 e

CW1 ([facility])

 

             Tdap         2020 11:34:00 AM EST completed                 e

CW1 ([facility])

 

             Tdap         2020 11:34:00 AM EST completed                 e

CW1 ([facility])



                                                                                
                                                         



Medications

          



          Medication Brand Name Start Date Product Form Dose      Route     Admi

nistrative 

Instructions Pharmacy Instructions Status     Indications Reaction   Description

 Data 

Source(s)

 

          325 mg (65 mg iron)           10/29/2020 12:00:00 AM EDT tablet    30 

                 TAKE ONE TABLET BY 

MOUTH EVERY DAY TAKE ONE TABLET BY MOUTH EVERY DAY SOLD: 10/29/2020             

                     [facility]

Drugs

 

                          ferrous sulfate 325 MG Oral Tablet Ferrous Sulfate 325

 (65 Fe) MG Ferrous 

Sulfate 325 (65 Fe) MG 10/28/2020 12:00:00 AM EDT         1.0 {tablet}          

               active          

                          Ferrous Sulfate 325 (65 Fe) MG eCW1 ([facility])

 

                          ferrous sulfate 325 MG Oral Tablet Ferrous Sulfate 325

 (65 Fe) MG Ferrous 

Sulfate 325 (65 Fe) MG 10/28/2020 12:00:00 AM EDT         1.0 {tablet}          

               active          

                          Ferrous Sulfate 325 (65 Fe) MG eCW1 ([facility])

 

                          ferrous sulfate 325 MG Oral Tablet Ferrous Sulfate 325

 (65 Fe) MG Ferrous 

Sulfate 325 (65 Fe) MG 10/28/2020 12:00:00 AM EDT         1.0 {tablet}          

               active          

                          Ferrous Sulfate 325 (65 Fe) MG eCW1 ([facility])

 

                          ferrous sulfate 325 MG Oral Tablet Ferrous Sulfate 325

 (65 Fe) MG Ferrous 

Sulfate 325 (65 Fe) MG 10/28/2020 12:00:00 AM EDT         1.0 {tablet}          

               active          

                          Ferrous Sulfate 325 (65 Fe) MG eCW1 ([facility])

 

                          ferrous sulfate 325 MG Oral Tablet Ferrous Sulfate 325

 (65 Fe) MG Ferrous 

Sulfate 325 (65 Fe) MG 10/28/2020 12:00:00 AM EDT         1.0 {tablet}          

               active          

                          Ferrous Sulfate 325 (65 Fe) MG eCW1 ([facility])

 

                          ferrous sulfate 325 MG Oral Tablet Ferrous Sulfate 325

 (65 Fe) MG Ferrous 

Sulfate 325 (65 Fe) MG 10/28/2020 12:00:00 AM EDT         1.0 {tablet}          

               active          

                          Ferrous Sulfate 325 (65 Fe) MG eCW1 ([facility])



                                                                                
                                               



Insurance Providers

          



             Payer name   Policy type / Coverage type Policy ID    Covered party

 ID Covered 

party's relationship to pritchett Policy Pritchett             Plan Information

 

          [facility] COMMUNITY PLAN [facility]           [id number]           SP           

       [id number]

 

          [facility]         [id number]           S              

     [id number]

 

          [facility] [employer] (HMO) [id number] 

          Self                

[id number]

 

          [facility] P         [id number]           S      

             [id number]

 

          [facility] COMMUNITY PLAN [facility]           [id number]           SP           

       [id number]

 

          MEDICAID  S         [id number]            S                   [id number]

 

          [facility]([employer]) P         [id number]           S              

     [id number]

 

          MEDICAID            [id number]            SP                  [id number]



                                                                                
                                                                             



Problems, Conditions, and Diagnoses

          



           Code       Display Name Description Problem Type Effective Dates Data

 Source(s)

 

                O99.013         Anemia of pregnancy Anemia affecting pregnancy i

n third trimester 

Problem                   2020 12:00:00 AM EST eCW1 ([facility])

 

                O99.332         [id number]       Smoking (tobacco) complicating p

regnancy, second trimester 

Problem                   10/28/2020 12:00:00 AM EDT eCW1 ([facility])

 

             Z34.80       Pregnancy care Supervision of other normal pregnancy P

roblem      2020 

12:00:00 AM EDT                         eCW1 ([facility])



                                                                                
                                     



Surgeries/Procedures

          



             Procedure    Description  Date         Indications  Data Source(s)

 

                    Injection: RhoGAM 300mcg/1.5mL IM (Rho [D] Immune Globulin H

uman)                     2020 

12:00:00 AM EST                                     eCW1 ([facility])

 

             Immunization: Boostrix 0.5mL IM (TDAP)              2020 12:0

0:00 AM EST              eCW1 

([facility])



                                                                                
                  



Results

          



                    ID                  Date                Data Source

 

                    [id number]         2021 10:00:00 AM EST [facility]









          Name      Value     Range     Interpretation Code Description Data Mimi

rce(s) Supporting 

Document(s)

 

          SARS coronavirus 2 RNA Not Detected                               [facility]

[place]     

 

                                        This lab was ordered by [facility] and reported by [facility]. 









                    ID                  Date                Data Source

 

                    GROUP B STREP CULTURE 2021 12:00:00 AM EST eCW1 ([facility])









          Name      Value     Range     Interpretation Code Description Data Mimi

rce(s) Supporting 

Document(s)

 

                                                  GROUP B STREP CULTURE eCW1 (On license of [facility])  







                                        Procedure

 

                                          



                                                                                
                            



Social History

          



           Code       Duration   Value      Status     Description Data Source(s

)

 

           Smoking    2021 12:00:00 AM EST Current Smoker completed  [facility]

nt Smoker eCW1 

([facility])

 

           Smoking    2020 12:00:00 AM EST Current Smoker completed  [facility]re

nt Smoker eCW1 

([facility])

 

           Smoking    2020 12:00:00 AM EST Current Smoker completed  [facility]re

nt Smoker eCW1 

([facility])

 

           Smoking    2020 12:00:00 AM EST Current Smoker completed  [facility]re

nt Smoker eCW1 

([facility])

 

           Smoking    10/23/2020 12:00:00 AM EDT Current Smoker completed  [facility]re

nt Smoker eCW1 

([facility])

 

           Smoking    10/23/2020 12:00:00 AM EDT Current Smoker completed  [facility]re

nt Smoker eCW1 

([facility])



                                                                                
                                                                    



Vital Signs

          



                    ID                  Date                Data Source

 

                    UNK                                      









           Name       Value      Range      Interpretation Code Description Data

 Source(s)

 

           Diastolic blood pressure 72 mm[Hg]                        72 mm[Hg]  

eCW1 ([facility])

 

           Systolic blood pressure 108 mm[Hg]                       108 mm[Hg] e

CW1 ([facility])

 

           Body mass index (BMI) [Ratio] 27.693 kg/m2                       27.6

93 kg/m2 W1 ([facility])

 

           Body height 60 [in_i]                        60 [in_i]  eCW1 ([facility])

 

           Body weight 64.32 kg                         64.32 kg   eCW1 ([facility])

 

           Body weight 141.8 [lb_av]                       141.8 [lb_av] eCW1 ([facility])

 

           Diastolic blood pressure 56 mm[Hg]                        56 mm[Hg]  

eCW1 ([facility])

 

           Systolic blood pressure 106 mm[Hg]                       106 mm[Hg] e

CW1 ([facility])

 

           Body mass index (BMI) [Ratio] 26.951 kg/m2                       26.9

51 kg/m2 eCW1 ([facility])

 

           Body height 60 [in_i]                        60 [in_i]  eCW1 ([facility])

 

           Body weight 62.6 kg                          62.6 kg    eCW1 ([facility])

 

           Body weight 138.0 [lb_av]                       138.0 [lb_av] eCW1 ([facility])

 

           Body weight 134.6 [lb_av]                       134.6 [lb_av] eCW1 ([facility])

 

           Diastolic blood pressure 58 mm[Hg]                        58 mm[Hg]  

eCW1 ([facility])

 

           Systolic blood pressure 98 mm[Hg]                        98 mm[Hg]  e

CW1 ([facility])

 

           Body mass index (BMI) [Ratio] 26.287 kg/m2                       26.2

87 kg/m2 eCW1 ([facility])

 

           Body height 60 [in_i]                        60 [in_i]  eCW1 ([facility])

 

           Diastolic blood pressure 58 mm[Hg]                        58 mm[Hg]  

eCW1 ([facility])

 

           Systolic blood pressure 102 mm[Hg]                       102 mm[Hg] e

CW1 ([facility])

 

           Body mass index (BMI) [Ratio] 25.936 kg/m2                       25.9

36 kg/m2 W1 ([facility])

 

           Body height 60 [in_i]                        60 [in_i]  eCW1 ([facility])

 

           Body weight 132.8 [lb_av]                       132.8 [lb_av] eCW1 ([facility])



                                                                                
                  



Patient Treatment Plan of Care

          



             Planned Activity Planned Date Details      Description  Data Source

(s)

 

             ferrous sulfate 325 MG Oral Tablet 10/28/2020 12:00:00 AM EDT      

                     eCW1 

([facility])

 

             ferrous sulfate 325 MG Oral Tablet 10/28/2020 12:00:00 AM EDT      

                     eCW1 

([facility])

 

             ferrous sulfate 325 MG Oral Tablet 10/28/2020 12:00:00 AM EDT      

                     eCW1 

([facility]) no

## 2025-03-04 ENCOUNTER — HOSPITAL ENCOUNTER (OUTPATIENT)
Dept: HOSPITAL 53 - M WHC | Age: 35
End: 2025-03-04
Attending: OBSTETRICS & GYNECOLOGY
Payer: COMMERCIAL

## 2025-03-04 ENCOUNTER — HOSPITAL ENCOUNTER (OUTPATIENT)
Dept: HOSPITAL 53 - M PLALAB | Age: 35
End: 2025-03-04
Attending: OBSTETRICS & GYNECOLOGY
Payer: COMMERCIAL

## 2025-03-04 DIAGNOSIS — Z36.89: Primary | ICD-10-CM

## 2025-03-04 DIAGNOSIS — Z34.80: Primary | ICD-10-CM

## 2025-03-04 LAB
HCT VFR BLD AUTO: 31.1 % (ref 36–47)
HCV AB SER QL: 0.36 INDEX (ref ?–0.8)
HGB BLD-MCNC: 10.4 G/DL (ref 12–15.5)
HIV 1+2 AB+HIV1 P24 AG SERPL QL IA: NEGATIVE
MCH RBC QN AUTO: 30.8 PG (ref 27–33)
MCHC RBC AUTO-ENTMCNC: 33.4 G/DL (ref 32–36.5)
MCV RBC AUTO: 92 FL (ref 80–96)
N GONORRHOEA RRNA SPEC QL NAA+PROBE: NEGATIVE
PLATELET # BLD AUTO: 260 10^3/UL (ref 150–450)
RBC # BLD AUTO: 3.38 10^6/UL (ref 4–5.4)
T VAGINALIS RRNA SPEC QL NAA+PROBE: NOT DETECTED
WBC # BLD AUTO: 13.6 10^3/UL (ref 4–10)

## 2025-03-28 ENCOUNTER — HOSPITAL ENCOUNTER (OUTPATIENT)
Dept: HOSPITAL 53 - M WHC | Age: 35
End: 2025-03-28
Attending: OBSTETRICS & GYNECOLOGY
Payer: COMMERCIAL

## 2025-03-28 DIAGNOSIS — Z3A.22: ICD-10-CM

## 2025-03-28 DIAGNOSIS — Z36.89: Primary | ICD-10-CM

## 2025-07-10 ENCOUNTER — HOSPITAL ENCOUNTER (OUTPATIENT)
Dept: HOSPITAL 53 - M PLALAB | Age: 35
End: 2025-07-10
Attending: GENERAL PRACTICE
Payer: COMMERCIAL

## 2025-07-10 DIAGNOSIS — Z34.80: Primary | ICD-10-CM

## 2025-07-10 LAB
HCT VFR BLD AUTO: 28.8 % (ref 36–47)
HCV AB SER QL: 0.21 INDEX (ref ?–0.8)
HGB BLD-MCNC: 9.3 G/DL (ref 12–15.5)
HIV 1+2 AB+HIV1 P24 AG SERPL QL IA: NEGATIVE
MCH RBC QN AUTO: 29.5 PG (ref 27–33)
MCHC RBC AUTO-ENTMCNC: 32.3 G/DL (ref 32–36.5)
MCV RBC AUTO: 91.4 FL (ref 80–96)
N GONORRHOEA RRNA SPEC QL NAA+PROBE: NEGATIVE
PLATELET # BLD AUTO: 227 10^3/UL (ref 150–450)
RBC # BLD AUTO: 3.15 10^6/UL (ref 4–5.4)
T VAGINALIS RRNA SPEC QL NAA+PROBE: NOT DETECTED
WBC # BLD AUTO: 11.1 10^3/UL (ref 4–10)

## 2025-07-22 ENCOUNTER — HOSPITAL ENCOUNTER (INPATIENT)
Dept: HOSPITAL 53 - M LDI | Age: 35
LOS: 2 days | Discharge: HOME | DRG: 540 | End: 2025-07-24
Attending: OBSTETRICS & GYNECOLOGY | Admitting: OBSTETRICS & GYNECOLOGY
Payer: COMMERCIAL

## 2025-07-22 VITALS — DIASTOLIC BLOOD PRESSURE: 59 MMHG | OXYGEN SATURATION: 99 % | SYSTOLIC BLOOD PRESSURE: 108 MMHG

## 2025-07-22 VITALS — SYSTOLIC BLOOD PRESSURE: 102 MMHG | OXYGEN SATURATION: 98 % | DIASTOLIC BLOOD PRESSURE: 52 MMHG

## 2025-07-22 VITALS — BODY MASS INDEX: 31.41 KG/M2 | HEIGHT: 60 IN | WEIGHT: 159.99 LBS

## 2025-07-22 VITALS — DIASTOLIC BLOOD PRESSURE: 59 MMHG | OXYGEN SATURATION: 98 % | SYSTOLIC BLOOD PRESSURE: 106 MMHG

## 2025-07-22 VITALS — SYSTOLIC BLOOD PRESSURE: 110 MMHG | OXYGEN SATURATION: 100 % | DIASTOLIC BLOOD PRESSURE: 57 MMHG

## 2025-07-22 VITALS — SYSTOLIC BLOOD PRESSURE: 101 MMHG | DIASTOLIC BLOOD PRESSURE: 59 MMHG | OXYGEN SATURATION: 98 %

## 2025-07-22 VITALS — SYSTOLIC BLOOD PRESSURE: 116 MMHG | DIASTOLIC BLOOD PRESSURE: 72 MMHG

## 2025-07-22 VITALS — DIASTOLIC BLOOD PRESSURE: 53 MMHG | SYSTOLIC BLOOD PRESSURE: 113 MMHG | OXYGEN SATURATION: 98 %

## 2025-07-22 VITALS — OXYGEN SATURATION: 99 % | DIASTOLIC BLOOD PRESSURE: 65 MMHG | SYSTOLIC BLOOD PRESSURE: 108 MMHG

## 2025-07-22 DIAGNOSIS — Z30.2: ICD-10-CM

## 2025-07-22 DIAGNOSIS — O34.211: Primary | ICD-10-CM

## 2025-07-22 DIAGNOSIS — Z3A.39: ICD-10-CM

## 2025-07-22 LAB
BASE EXCESS BLDCOV CALC-SCNC: -3 MMOL/L
CO2 BLDCOV CALC-SCNC: 23 MMOL/L
HCO3 STD BLDCOV-SCNC: 21.1 MMOL/L
HCO3 STD BLDCOV-SCNC: 21.9 MMOL/L
HCT VFR BLD AUTO: 30.2 % (ref 36–47)
HCV AB SER QL: 0.13 INDEX (ref ?–0.8)
HGB BLD-MCNC: 9.8 G/DL (ref 12–15.5)
HIV 1+2 AB+HIV1 P24 AG SERPL QL IA: NEGATIVE
MCH RBC QN AUTO: 29.4 PG (ref 27–33)
MCHC RBC AUTO-ENTMCNC: 32.5 G/DL (ref 32–36.5)
MCV RBC AUTO: 90.7 FL (ref 80–96)
PCO2 BLDCOV: 38.6 MMHG
PH BLDCOV: 7.37 UNITS
PLATELET # BLD AUTO: 215 10^3/UL (ref 150–450)
PO2 BLDCOV: 23.7 MMHG
RBC # BLD AUTO: 3.33 10^6/UL (ref 4–5.4)
SAO2 % BLDCOV: 63.1 %
WBC # BLD AUTO: 11.7 10^3/UL (ref 4–10)

## 2025-07-22 PROCEDURE — 0UB70ZZ EXCISION OF BILATERAL FALLOPIAN TUBES, OPEN APPROACH: ICD-10-PCS | Performed by: OBSTETRICS & GYNECOLOGY

## 2025-07-22 RX ADMIN — SODIUM CHLORIDE, POTASSIUM CHLORIDE, SODIUM LACTATE AND CALCIUM CHLORIDE STA ML: 600; 310; 30; 20 INJECTION, SOLUTION INTRAVENOUS at 06:46

## 2025-07-22 RX ADMIN — ONDANSETRON ONE MG: 2 INJECTION, SOLUTION INTRAMUSCULAR; INTRAVENOUS at 09:36

## 2025-07-22 RX ADMIN — ONDANSETRON PRN MG: 2 INJECTION, SOLUTION INTRAMUSCULAR; INTRAVENOUS at 15:20

## 2025-07-22 RX ADMIN — CEFAZOLIN ONE MLS/HR: 2 INJECTION, POWDER, LYOPHILIZED, FOR SOLUTION INTRAVENOUS at 09:11

## 2025-07-22 RX ADMIN — SODIUM CHLORIDE, PRESERVATIVE FREE SCH ML: 5 INJECTION INTRAVENOUS at 11:35

## 2025-07-22 RX ADMIN — SODIUM CHLORIDE, POTASSIUM CHLORIDE, SODIUM LACTATE AND CALCIUM CHLORIDE SCH MLS/HR: 600; 310; 30; 20 INJECTION, SOLUTION INTRAVENOUS at 13:46

## 2025-07-22 RX ADMIN — SODIUM CHLORIDE, POTASSIUM CHLORIDE, SODIUM LACTATE AND CALCIUM CHLORIDE SCH MLS/HR: 600; 310; 30; 20 INJECTION, SOLUTION INTRAVENOUS at 07:13

## 2025-07-22 RX ADMIN — KETOROLAC TROMETHAMINE SCH MG: 30 INJECTION, SOLUTION INTRAMUSCULAR at 18:10

## 2025-07-22 RX ADMIN — DIPHENHYDRAMINE HYDROCHLORIDE PRN MG: 50 INJECTION, SOLUTION INTRAMUSCULAR; INTRAVENOUS at 12:45

## 2025-07-22 RX ADMIN — SODIUM CHLORIDE, PRESERVATIVE FREE SCH ML: 5 INJECTION INTRAVENOUS at 11:40

## 2025-07-23 VITALS — SYSTOLIC BLOOD PRESSURE: 98 MMHG | DIASTOLIC BLOOD PRESSURE: 57 MMHG | OXYGEN SATURATION: 99 %

## 2025-07-23 VITALS — SYSTOLIC BLOOD PRESSURE: 108 MMHG | OXYGEN SATURATION: 100 % | DIASTOLIC BLOOD PRESSURE: 60 MMHG

## 2025-07-23 VITALS — OXYGEN SATURATION: 100 % | DIASTOLIC BLOOD PRESSURE: 56 MMHG | SYSTOLIC BLOOD PRESSURE: 107 MMHG

## 2025-07-23 VITALS — DIASTOLIC BLOOD PRESSURE: 66 MMHG | OXYGEN SATURATION: 99 % | SYSTOLIC BLOOD PRESSURE: 102 MMHG

## 2025-07-23 VITALS — OXYGEN SATURATION: 99 % | DIASTOLIC BLOOD PRESSURE: 54 MMHG | SYSTOLIC BLOOD PRESSURE: 91 MMHG

## 2025-07-23 VITALS — DIASTOLIC BLOOD PRESSURE: 56 MMHG | OXYGEN SATURATION: 100 % | SYSTOLIC BLOOD PRESSURE: 105 MMHG

## 2025-07-23 LAB
HCT VFR BLD AUTO: 24.1 % (ref 36–47)
HGB BLD-MCNC: 7.7 G/DL (ref 12–15.5)
MCH RBC QN AUTO: 30.1 PG (ref 27–33)
MCHC RBC AUTO-ENTMCNC: 32 G/DL (ref 32–36.5)
MCV RBC AUTO: 94.1 FL (ref 80–96)
PLATELET # BLD AUTO: 170 10^3/UL (ref 150–450)
RBC # BLD AUTO: 2.56 10^6/UL (ref 4–5.4)
WBC # BLD AUTO: 10.1 10^3/UL (ref 4–10)

## 2025-07-23 RX ADMIN — OXYCODONE HYDROCHLORIDE AND ACETAMINOPHEN PRN TAB: 5; 325 TABLET ORAL at 12:29

## 2025-07-23 RX ADMIN — IBUPROFEN SCH MG: 800 TABLET, FILM COATED ORAL at 13:58

## 2025-07-23 RX ADMIN — Medication PRN MG: at 13:59

## 2025-07-23 RX ADMIN — HUMAN RHO(D) IMMUNE GLOBULIN SCH MCG: 300 INJECTION, SOLUTION INTRAMUSCULAR at 10:50

## 2025-07-23 RX ADMIN — MEASLES, MUMPS, AND RUBELLA VIRUS VACCINE LIVE ONE ML: 1000; 12500; 1000 INJECTION, POWDER, LYOPHILIZED, FOR SUSPENSION SUBCUTANEOUS at 10:12

## 2025-07-23 RX ADMIN — Medication SCH TAB: at 08:24

## 2025-07-24 VITALS — SYSTOLIC BLOOD PRESSURE: 117 MMHG | OXYGEN SATURATION: 98 % | DIASTOLIC BLOOD PRESSURE: 67 MMHG

## 2025-07-24 VITALS — OXYGEN SATURATION: 98 % | SYSTOLIC BLOOD PRESSURE: 99 MMHG | DIASTOLIC BLOOD PRESSURE: 52 MMHG

## 2025-07-24 VITALS — SYSTOLIC BLOOD PRESSURE: 123 MMHG | OXYGEN SATURATION: 99 % | DIASTOLIC BLOOD PRESSURE: 71 MMHG

## 2025-07-24 RX ADMIN — DOCUSATE SODIUM PRN MG: 100 CAPSULE, LIQUID FILLED ORAL at 08:09

## 2025-07-24 RX ADMIN — ACETAMINOPHEN PRN MG: 500 TABLET ORAL at 12:43
